# Patient Record
Sex: MALE | Race: WHITE | NOT HISPANIC OR LATINO
[De-identification: names, ages, dates, MRNs, and addresses within clinical notes are randomized per-mention and may not be internally consistent; named-entity substitution may affect disease eponyms.]

---

## 2018-08-06 PROBLEM — Z00.00 ENCOUNTER FOR PREVENTIVE HEALTH EXAMINATION: Status: ACTIVE | Noted: 2018-08-06

## 2018-08-14 ENCOUNTER — APPOINTMENT (OUTPATIENT)
Dept: ORTHOPEDIC SURGERY | Facility: CLINIC | Age: 53
End: 2018-08-14

## 2021-03-24 ENCOUNTER — APPOINTMENT (OUTPATIENT)
Dept: PULMONOLOGY | Facility: CLINIC | Age: 56
End: 2021-03-24

## 2021-03-31 ENCOUNTER — APPOINTMENT (OUTPATIENT)
Dept: PULMONOLOGY | Facility: CLINIC | Age: 56
End: 2021-03-31
Payer: MEDICARE

## 2021-03-31 VITALS
SYSTOLIC BLOOD PRESSURE: 120 MMHG | WEIGHT: 203 LBS | HEIGHT: 70 IN | BODY MASS INDEX: 29.06 KG/M2 | DIASTOLIC BLOOD PRESSURE: 70 MMHG | HEART RATE: 70 BPM

## 2021-03-31 DIAGNOSIS — R06.83 SNORING: ICD-10-CM

## 2021-03-31 DIAGNOSIS — G47.19 OTHER HYPERSOMNIA: ICD-10-CM

## 2021-03-31 PROCEDURE — 99204 OFFICE O/P NEW MOD 45 MIN: CPT

## 2021-03-31 RX ORDER — SULFAMETHOXAZOLE AND TRIMETHOPRIM 800; 160 MG/1; MG/1
800-160 TABLET ORAL
Qty: 90 | Refills: 0 | Status: ACTIVE | COMMUNITY
Start: 2020-05-05

## 2021-03-31 RX ORDER — MONTELUKAST 10 MG/1
10 TABLET, FILM COATED ORAL
Qty: 90 | Refills: 0 | Status: ACTIVE | COMMUNITY
Start: 2020-12-16

## 2021-03-31 RX ORDER — LEVOTHYROXINE SODIUM 0.2 MG/1
200 TABLET ORAL
Qty: 104 | Refills: 0 | Status: ACTIVE | COMMUNITY
Start: 2021-03-10

## 2021-03-31 RX ORDER — BUDESONIDE 180 UG/1
180 AEROSOL, POWDER RESPIRATORY (INHALATION)
Qty: 2 | Refills: 0 | Status: ACTIVE | COMMUNITY
Start: 2021-03-19

## 2021-03-31 RX ORDER — OXYBUTYNIN CHLORIDE 5 MG/1
5 TABLET ORAL
Qty: 90 | Refills: 0 | Status: ACTIVE | COMMUNITY
Start: 2020-12-14

## 2021-03-31 RX ORDER — GABAPENTIN 600 MG/1
600 TABLET, COATED ORAL
Qty: 180 | Refills: 0 | Status: ACTIVE | COMMUNITY
Start: 2020-12-14
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
## 2021-03-31 NOTE — ASSESSMENT
[FreeTextEntry1] : Patient has symptoms suggestive of sleep apnea. Will order a sleep study. Discussed about details of the study and possible treatment options.\par

## 2021-03-31 NOTE — PHYSICAL EXAM
[General Appearance - Well Developed] : well developed [General Appearance - Well Nourished] : well nourished [Murmurs] : no murmurs [Heart Sounds] : normal S1 and S2 [] : no respiratory distress [Auscultation Breath Sounds / Voice Sounds] : lungs were clear to auscultation bilaterally [Oriented To Time, Place, And Person] : oriented to person, place, and time [Affect] : the affect was normal [FreeTextEntry1] : quadriplegic, has upper extremity function

## 2021-05-12 ENCOUNTER — APPOINTMENT (OUTPATIENT)
Dept: PULMONOLOGY | Facility: CLINIC | Age: 56
End: 2021-05-12
Payer: MEDICARE

## 2021-05-12 VITALS
BODY MASS INDEX: 29.06 KG/M2 | OXYGEN SATURATION: 93 % | HEART RATE: 66 BPM | HEIGHT: 70 IN | TEMPERATURE: 97.7 F | WEIGHT: 203 LBS | DIASTOLIC BLOOD PRESSURE: 72 MMHG | SYSTOLIC BLOOD PRESSURE: 110 MMHG | RESPIRATION RATE: 16 BRPM

## 2021-05-12 PROCEDURE — 99213 OFFICE O/P EST LOW 20 MIN: CPT

## 2021-05-12 NOTE — ASSESSMENT
[FreeTextEntry1] : 55-year-old man with severe obstructive sleep apnea with AHI of 34, today we showed different masks and I think the easiest one would be a nasal pillow Yadav mask that he will try with.\par \par Patient use it will use the CPAP with auto setting 5 to 20 cm and follow-up with me in 6 weeks time after he starts using the machine to adjust the pressures.

## 2021-05-12 NOTE — PHYSICAL EXAM
[General Appearance - Well Developed] : well developed [General Appearance - Well Nourished] : well nourished [Heart Sounds] : normal S1 and S2 [Murmurs] : no murmurs [] : no respiratory distress [Auscultation Breath Sounds / Voice Sounds] : lungs were clear to auscultation bilaterally [No Focal Deficits] : no focal deficits [Oriented To Time, Place, And Person] : oriented to person, place, and time [FreeTextEntry1] : no edema
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
## 2021-07-20 ENCOUNTER — APPOINTMENT (OUTPATIENT)
Dept: SURGERY | Facility: CLINIC | Age: 56
End: 2021-07-20

## 2021-08-02 ENCOUNTER — APPOINTMENT (OUTPATIENT)
Dept: VASCULAR SURGERY | Facility: CLINIC | Age: 56
End: 2021-08-02

## 2021-11-02 ENCOUNTER — APPOINTMENT (OUTPATIENT)
Dept: SURGERY | Facility: CLINIC | Age: 56
End: 2021-11-02

## 2021-11-09 ENCOUNTER — APPOINTMENT (OUTPATIENT)
Dept: SURGERY | Facility: CLINIC | Age: 56
End: 2021-11-09

## 2021-11-16 ENCOUNTER — APPOINTMENT (OUTPATIENT)
Dept: SURGERY | Facility: CLINIC | Age: 56
End: 2021-11-16

## 2021-11-30 ENCOUNTER — APPOINTMENT (OUTPATIENT)
Dept: SURGERY | Facility: CLINIC | Age: 56
End: 2021-11-30

## 2021-12-07 ENCOUNTER — APPOINTMENT (OUTPATIENT)
Dept: SURGERY | Facility: CLINIC | Age: 56
End: 2021-12-07

## 2021-12-28 ENCOUNTER — APPOINTMENT (OUTPATIENT)
Dept: SURGERY | Facility: CLINIC | Age: 56
End: 2021-12-28

## 2022-01-04 ENCOUNTER — APPOINTMENT (OUTPATIENT)
Dept: SURGERY | Facility: CLINIC | Age: 57
End: 2022-01-04

## 2022-01-11 ENCOUNTER — APPOINTMENT (OUTPATIENT)
Dept: SURGERY | Facility: CLINIC | Age: 57
End: 2022-01-11

## 2022-01-25 ENCOUNTER — APPOINTMENT (OUTPATIENT)
Dept: SURGERY | Facility: CLINIC | Age: 57
End: 2022-01-25

## 2022-02-01 ENCOUNTER — APPOINTMENT (OUTPATIENT)
Dept: SURGERY | Facility: CLINIC | Age: 57
End: 2022-02-01

## 2022-02-03 ENCOUNTER — APPOINTMENT (OUTPATIENT)
Dept: THORACIC SURGERY | Facility: CLINIC | Age: 57
End: 2022-02-03
Payer: MEDICARE

## 2022-02-03 VITALS
DIASTOLIC BLOOD PRESSURE: 88 MMHG | HEART RATE: 65 BPM | WEIGHT: 205 LBS | BODY MASS INDEX: 29.41 KG/M2 | RESPIRATION RATE: 16 BRPM | TEMPERATURE: 97.8 F | OXYGEN SATURATION: 95 % | SYSTOLIC BLOOD PRESSURE: 128 MMHG

## 2022-02-03 PROCEDURE — 99204 OFFICE O/P NEW MOD 45 MIN: CPT

## 2022-02-08 NOTE — PHYSICAL EXAM
[General Appearance - Alert] : alert [General Appearance - In No Acute Distress] : in no acute distress [Sclera] : the sclera and conjunctiva were normal [PERRL With Normal Accommodation] : pupils were equal in size, round, and reactive to light [Extraocular Movements] : extraocular movements were intact [Outer Ear] : the ears and nose were normal in appearance [Oropharynx] : the oropharynx was normal [Neck Appearance] : the appearance of the neck was normal [Neck Cervical Mass (___cm)] : no neck mass was observed [Jugular Venous Distention Increased] : there was no jugular-venous distention [Thyroid Diffuse Enlargement] : the thyroid was not enlarged [Thyroid Nodule] : there were no palpable thyroid nodules [Auscultation Breath Sounds / Voice Sounds] : lungs were clear to auscultation bilaterally [Heart Rate And Rhythm] : heart rate was normal and rhythm regular [Heart Sounds] : normal S1 and S2 [Heart Sounds Gallop] : no gallops [Murmurs] : no murmurs [Heart Sounds Pericardial Friction Rub] : no pericardial rub [FreeTextEntry1] : Right lateral flank with nonhealing wound with greenish drainage no tenderness approximately 4 cm in size [Bowel Sounds] : normal bowel sounds [Abdomen Soft] : soft [Abdomen Tenderness] : non-tender [] : no hepato-splenomegaly [Abdomen Mass (___ Cm)] : no abdominal mass palpated [Cervical Lymph Nodes Enlarged Posterior Bilaterally] : posterior cervical [Cervical Lymph Nodes Enlarged Anterior Bilaterally] : anterior cervical [Supraclavicular Lymph Nodes Enlarged Bilaterally] : supraclavicular [Axillary Lymph Nodes Enlarged Bilaterally] : axillary [Femoral Lymph Nodes Enlarged Bilaterally] : femoral [Inguinal Lymph Nodes Enlarged Bilaterally] : inguinal [No CVA Tenderness] : no ~M costovertebral angle tenderness [No Spinal Tenderness] : no spinal tenderness

## 2022-02-08 NOTE — REASON FOR VISIT
[Initial Evaluation] : an initial evaluation [FreeTextEntry1] : Right flank abscess/hematoma going down to the rib

## 2022-02-08 NOTE — DATA REVIEWED
[FreeTextEntry1] : CT Chest 1/19/22: \par \par History: T79.2XXD.\par \par CT chest and abdomen 1/19/2022 \par \par Thin helical axial sections through the chest and abdomen obtained without\par oral intravenous contrast. Coronal and sagittal reconstructed images provided.\par \par Comparison made to previous CT chest 9/24/2021 and abdominal CT 8/16/2020\par \par CT CHEST:\par \par Lung images again demonstrates bilateral chronic left greater the right lower\par lobe and to lesser extent right middle lobe parenchymal disease probably\par representing combination of atelectasis or fibrosis. There are no new areas of\par airspace disease. There is no central endobronchial obstructing lesions. There\par is no pneumothorax.\par \par Soft tissue images again demonstrates a residual right lateral chest wall\par abnormal area of soft tissue density within subcutaneous tissues presumably\par representing chronic hematoma measuring approximately 3.1 x 8.5 x 7.4 cm\par without overall significant change since prior study. Lack of IV contrast\par limits evaluation indeterminant possible cystic internal component of this\par soft tissue area. There is interval development of a cutaneous skin defect\par measuring approximately 4 cm at the inferior margin of the complex soft tissue\par inflammatory process. Clinical correlation. There is no adjacent rib fracture\par or cortical bone destruction. There is mild increased sclerosis within the\par right anterolateral sixth rib at the level of the soft tissue inflammatory\par process similar to prior study which may relate to possible reactive bony\par changes.\par \par There is no mediastinal mass or lymphadenopathy. There is residual thymic\par tissue within the anterior mediastinum. There is nonspecific interval increase\par in size of a couple of adjacent right axillary lymph nodes with the largest\par measuring 2.6 hemorrhage is on coronal image 31, previously 1.9 cm. The\par finding may relate to reactive increased size of lymph nodes given the right\par chest wall inflammatory process or other etiologies. Recommend clinical\par follow-up to resolution. The thoracic aorta is normal in caliber. The heart is\par normal in size. There is no pericardial effusion. There are bilateral chronic\par trace pleural effusions. There is a small hiatal hernia.\par \par Bone images demonstrate no acute fracture and similar to prior study.\par \par CT ABDOMEN:\par \par The liver is normal in size without focal mass on this noncontrast study. The\par spleen is small in size containing scattered calcifications likely related to\par old granulomatous disease. The gallbladder is contracted.\par \par The pancreas is normal in size. There are no adrenal masses. The kidneys\par reveal no calculi or hydronephrosis. The abdominal aorta is normal in caliber.\par There is no retroperitoneal lymphadenopathy.\par \par There is no bowel obstructing process. The stomach is distended with ingested\par liquid and food material. There is no ascites. There is no mesenteric\par lymphadenopathy.\par \par Bone images imaging no acute fracture. There is again a sclerotic density\par within the L1 vertebral body.\par \par IMPRESSION:\par \par Persistent right lateral abdominal wall subcutaneous soft tissue process\par presumably representing chronic hematoma with interval development of a 4 cm\par cutaneous defect. Clinical correlation.\par \par Persistent bilateral left greater than right lower lobe probably probably\par representing atelectasis/fibrosis.\par \par Persistent trace pleural effusion.\par \par Mild increase size of right axillary lymph node suggestive of adenopathy, may\par be reactive secondary to right chest wall inflammatory process. Recommend\par clinical follow-up to resolution.\par \par No acute abdominal inflammatory process.\par \par
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
## 2022-02-08 NOTE — CONSULT LETTER
[Dear  ___] : Dear  [unfilled], [Consult Letter:] : I had the pleasure of evaluating your patient, [unfilled]. [Please see my note below.] : Please see my note below. [Consult Closing:] : Thank you very much for allowing me to participate in the care of this patient.  If you have any questions, please do not hesitate to contact me. [Sincerely,] : Sincerely, [FreeTextEntry3] : Maximo Whitt MD\par Cardiothoracic Surgery\par [facility]\par

## 2022-02-08 NOTE — ASSESSMENT
[FreeTextEntry1] : This patient is a 56-year-old male who has a history of paraplegia.  The patient has a nonhealing wound in the right flank that is deep to the level of the rib.  The patient was evaluated and treated at wound care without any complete healing.  I believe the patient will need an incision and drainage and possible rib resection with washout.  I would recommend surgical drainage.  The patient will be scheduled for the operating room.  I would plan on removing part of the rib and sending it to rule out osteomyelitis as well as leaving the wound open for delayed healing with a wound VAC.  This was all discussed with the patient in detail.  All questions were answered.  He will be scheduled for surgery in the next week.

## 2022-02-09 ENCOUNTER — APPOINTMENT (OUTPATIENT)
Dept: THORACIC SURGERY | Facility: HOSPITAL | Age: 57
End: 2022-02-09

## 2022-02-15 ENCOUNTER — APPOINTMENT (OUTPATIENT)
Dept: SURGERY | Facility: CLINIC | Age: 57
End: 2022-02-15

## 2022-02-24 ENCOUNTER — APPOINTMENT (OUTPATIENT)
Dept: THORACIC SURGERY | Facility: CLINIC | Age: 57
End: 2022-02-24

## 2022-02-24 DIAGNOSIS — S21.101D: ICD-10-CM

## 2022-03-01 ENCOUNTER — APPOINTMENT (OUTPATIENT)
Dept: SURGERY | Facility: CLINIC | Age: 57
End: 2022-03-01

## 2022-03-08 ENCOUNTER — APPOINTMENT (OUTPATIENT)
Dept: SURGERY | Facility: CLINIC | Age: 57
End: 2022-03-08

## 2022-03-15 ENCOUNTER — APPOINTMENT (OUTPATIENT)
Dept: SURGERY | Facility: CLINIC | Age: 57
End: 2022-03-15

## 2022-03-22 ENCOUNTER — APPOINTMENT (OUTPATIENT)
Dept: SURGERY | Facility: CLINIC | Age: 57
End: 2022-03-22

## 2022-03-29 ENCOUNTER — APPOINTMENT (OUTPATIENT)
Dept: SURGERY | Facility: CLINIC | Age: 57
End: 2022-03-29

## 2022-04-01 ENCOUNTER — APPOINTMENT (OUTPATIENT)
Dept: SURGERY | Facility: CLINIC | Age: 57
End: 2022-04-01

## 2022-04-05 ENCOUNTER — APPOINTMENT (OUTPATIENT)
Dept: SURGERY | Facility: CLINIC | Age: 57
End: 2022-04-05

## 2022-04-06 PROBLEM — R33.9 URINARY RETENTION: Status: ACTIVE | Noted: 2022-04-06

## 2022-04-06 PROBLEM — Z85.850 HISTORY OF PAPILLARY ADENOCARCINOMA OF THYROID: Status: RESOLVED | Noted: 2022-04-06 | Resolved: 2022-04-06

## 2022-04-07 ENCOUNTER — APPOINTMENT (OUTPATIENT)
Dept: THORACIC SURGERY | Facility: CLINIC | Age: 57
End: 2022-04-07
Payer: MEDICARE

## 2022-04-07 VITALS
TEMPERATURE: 98 F | HEIGHT: 70 IN | WEIGHT: 205 LBS | OXYGEN SATURATION: 97 % | HEART RATE: 77 BPM | SYSTOLIC BLOOD PRESSURE: 130 MMHG | BODY MASS INDEX: 29.35 KG/M2 | DIASTOLIC BLOOD PRESSURE: 80 MMHG | RESPIRATION RATE: 14 BRPM

## 2022-04-07 DIAGNOSIS — R33.9 RETENTION OF URINE, UNSPECIFIED: ICD-10-CM

## 2022-04-07 DIAGNOSIS — Z85.850 PERSONAL HISTORY OF MALIGNANT NEOPLASM OF THYROID: ICD-10-CM

## 2022-04-07 PROBLEM — S21.101D: Status: ACTIVE | Noted: 2022-04-07

## 2022-04-07 PROCEDURE — 99024 POSTOP FOLLOW-UP VISIT: CPT

## 2022-04-11 NOTE — PROCEDURE
[FreeTextEntry1] : Wound care: dressing removed and replaced with 5.5cm Allevyn. Wound packing left in place as patient is scheduled to see VNS later today.

## 2022-04-11 NOTE — DATA REVIEWED
[FreeTextEntry1] : CT chest 3/10 2022\par \par Findings:\par LUNGS/AIRWAYS: There is subsegmental bilateral lower lobe atelectasis. There are no alveolar infiltrates. There are no suspicious pulmonary nodules.\par \par MEDIASTINUM/ERIN: No lymphadenopathy or mass lesions.\par \par CARDIOVASCULAR: Cardiac size is normal. There is a central venous catheter in place which extends through the innominate vein to the level of the distal superior vena cava.\par \par CHEST WALL/PLEURA: Within the right lateral chest wall, there is a 5.4 x 4.8 x 5.7 cm region of increased soft tissue density within the deep soft tissues extending into the intercostal space. The location of abnormality is associated with location of right lateral 7th rib resection. A partial air filled tract extends to the skin. No tract extends into the intercostal space. There is associated pleural thickening. There is a small layering right pleural effusion. \par \par AXILLA/SUPRACLAVICULAR: Several mildly enlarged right axillary lymph nodes are present and measure up to 18x12 mm in size. The visualized lymph nodes demonstrate preservation of the fatty erin without suspicious morphological characteristics.\par \par BONES: There is evidence of segmental absence of the lateral right seventh rib. Suspicious for prior rib resection, correlation with surgical history is advised.\par \par IMPRESSION:\par \par Small right pleural effusion and subsegmental bibasilar lower lobe atelectasis. Right chest wall mass-like lesion with associated cutaneous tract, associated with segmental absence of the right 7th rib. The imaging findings are nonspecific. No discrete loculated collections are identified.

## 2022-04-11 NOTE — ASSESSMENT
[FreeTextEntry1] : 56 year old male now POD 57 s/p incision and drainage of right chest wall wound. Patient states he is healing well and depth of wound has been gradually decreasing when measured by wound care.Patient has been followed post-op by Dr. Mica Underwood for wound care and Dr. Jan Michelle for ID on IV zosyn for pseudomonas and enterococcus found in the wound. Patient has no new complaints. Recent CT chest was reviewed and demonstrated subcutaneous tract with no loculations. At this time patient was advised to continue to follow with wound care and infectious disease with no further thoracic surgery procedure indicated. All questions were answered and patient will reach out to our office with any future concerns.

## 2022-04-11 NOTE — HISTORY OF PRESENT ILLNESS
[FreeTextEntry1] : This is a 56-year-old male s/p incision and drainage of right flank wound and excision of a portion of the eight rib on 2/9/22 for suspected osteomyelitis.  Cultures from that revealed Pseudomonas aeruginosa and Enterococcus faecalis. The patient is on IV Zosyn. He has a VAC dressing and a catheter from the Zosyn. Per ID note on 3/15,\par Wound is was continuing to look improved. The patient did have CT scan of the chest and abdomen after a previous visit with ID (Dr. Michelle) where he had complaints of worsening symptoms when he laid in bed in a certain way. The CT scan showed small right pleural effusion and subsegmental bibasilar lower lobe atelectasis. Right chest wall mass-like lesion with associated cutaneous tract, associated with segmental absence of the right 7th rib. The imaging findings are nonspecific. No discrete loculated collections are identified. Patient is here to follow up on surgical wound and recent imaging.

## 2022-04-11 NOTE — PHYSICAL EXAM
[Sclera] : the sclera and conjunctiva were normal [PERRL With Normal Accommodation] : pupils were equal in size, round, and reactive to light [Extraocular Movements] : extraocular movements were intact [Neck Appearance] : the appearance of the neck was normal [Neck Cervical Mass (___cm)] : no neck mass was observed [Jugular Venous Distention Increased] : there was no jugular-venous distention [Thyroid Diffuse Enlargement] : the thyroid was not enlarged [Thyroid Nodule] : there were no palpable thyroid nodules [Respiration, Rhythm And Depth] : normal respiratory rhythm and effort [Exaggerated Use Of Accessory Muscles For Inspiration] : no accessory muscle use [Auscultation Breath Sounds / Voice Sounds] : lungs were clear to auscultation bilaterally [Heart Rate And Rhythm] : heart rate was normal and rhythm regular [Heart Sounds] : normal S1 and S2 [Heart Sounds Gallop] : no gallops [Murmurs] : no murmurs [Heart Sounds Pericardial Friction Rub] : no pericardial rub [Examination Of The Chest] : the chest was normal in appearance [Diminished Respiratory Excursion] : normal chest expansion [Bowel Sounds] : normal bowel sounds [Abdomen Soft] : soft [Abdomen Tenderness] : non-tender [Abdomen Mass (___ Cm)] : no abdominal mass palpated [No CVA Tenderness] : no ~M costovertebral angle tenderness [No Spinal Tenderness] : no spinal tenderness [Skin Color & Pigmentation] : normal skin color and pigmentation [Skin Turgor] : normal skin turgor [] : no rash [Oriented To Time, Place, And Person] : oriented to person, place, and time [Impaired Insight] : insight and judgment were intact [Affect] : the affect was normal [Mood] : the mood was normal [FreeTextEntry1] : quadraplegia without sensation below upper chest

## 2022-06-07 ENCOUNTER — APPOINTMENT (OUTPATIENT)
Dept: SURGERY | Facility: CLINIC | Age: 57
End: 2022-06-07

## 2022-06-17 ENCOUNTER — APPOINTMENT (OUTPATIENT)
Dept: SURGERY | Facility: CLINIC | Age: 57
End: 2022-06-17

## 2022-09-27 ENCOUNTER — APPOINTMENT (OUTPATIENT)
Dept: SURGERY | Facility: CLINIC | Age: 57
End: 2022-09-27

## 2022-10-04 ENCOUNTER — RESULT REVIEW (OUTPATIENT)
Age: 57
End: 2022-10-04

## 2022-10-04 ENCOUNTER — APPOINTMENT (OUTPATIENT)
Dept: SURGERY | Facility: CLINIC | Age: 57
End: 2022-10-04

## 2022-12-27 ENCOUNTER — APPOINTMENT (OUTPATIENT)
Dept: HEART AND VASCULAR | Facility: CLINIC | Age: 57
End: 2022-12-27

## 2022-12-27 ENCOUNTER — NON-APPOINTMENT (OUTPATIENT)
Age: 57
End: 2022-12-27

## 2022-12-27 VITALS — SYSTOLIC BLOOD PRESSURE: 110 MMHG | HEART RATE: 76 BPM | OXYGEN SATURATION: 96 % | DIASTOLIC BLOOD PRESSURE: 90 MMHG

## 2022-12-27 DIAGNOSIS — I87.399 CHRONIC VENOUS HYPERTENSION (IDIOPATHIC) WITH OTHER COMPLICATIONS OF UNSPECIFIED LOWER EXTREMITY: ICD-10-CM

## 2022-12-27 DIAGNOSIS — I87.8 OTHER SPECIFIED DISORDERS OF VEINS: ICD-10-CM

## 2022-12-27 DIAGNOSIS — M79.89 OTHER SPECIFIED SOFT TISSUE DISORDERS: ICD-10-CM

## 2022-12-27 DIAGNOSIS — G82.50 QUADRIPLEGIA, UNSPECIFIED: ICD-10-CM

## 2022-12-27 DIAGNOSIS — I10 ESSENTIAL (PRIMARY) HYPERTENSION: ICD-10-CM

## 2022-12-27 DIAGNOSIS — G47.33 OBSTRUCTIVE SLEEP APNEA (ADULT) (PEDIATRIC): ICD-10-CM

## 2022-12-27 PROCEDURE — 99204 OFFICE O/P NEW MOD 45 MIN: CPT

## 2022-12-27 PROCEDURE — 93000 ELECTROCARDIOGRAM COMPLETE: CPT

## 2022-12-27 RX ORDER — AMMONIUM LACTATE 12 %
12 CREAM (GRAM) TOPICAL TWICE DAILY
Qty: 1 | Refills: 3 | Status: ACTIVE | COMMUNITY
Start: 2022-12-27 | End: 1900-01-01

## 2022-12-27 NOTE — PHYSICAL EXAM
[No Carotid Bruit] : no carotid bruit [Clear Lung Fields] : clear lung fields [Good Air Entry] : good air entry [Soft] : abdomen soft [Non Tender] : non-tender [de-identified] : wheel chair bound, paralyzed waist down.  upper extremity weakness  [de-identified] : bilateral pitting edema chronic venous stasis changes, right heel wound covered in bandaging.
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
## 2022-12-27 NOTE — CARDIOLOGY SUMMARY
[de-identified] : ECHO Nov 2022\par 1. Left ventricular systolic function is normal with an ejection fraction of 65-70 % by visual estimate.\par 2. Left ventricular segmental wall motion is normal.\par 3. The left ventricle demonstrates indeterminate diastolic function, cannot determine left atrial pressure.\par 4. There is mild left ventricular hypertrophy.\par 5. Right ventricular systolic function is normal.\par 6. Unable to estimate pulmonary artery pressure due to an insufficient tricuspid regurgitation Doppler waveform.\par 7. Left atrial chamber dimension is not well visualized. Left atrium is obscured by the descending thoracic aorta

## 2022-12-27 NOTE — REVIEW OF SYSTEMS
[Lower Ext Edema] : lower extremity edema [Skin Lesions] : skin lesion(s): [Negative] : Heme/Lymph [FreeTextEntry9] : chronic spinal stenosis back pain

## 2023-02-07 ENCOUNTER — RESULT REVIEW (OUTPATIENT)
Age: 58
End: 2023-02-07

## 2023-02-28 ENCOUNTER — APPOINTMENT (OUTPATIENT)
Dept: SURGERY | Facility: CLINIC | Age: 58
End: 2023-02-28

## 2023-03-28 ENCOUNTER — APPOINTMENT (OUTPATIENT)
Dept: HEART AND VASCULAR | Facility: CLINIC | Age: 58
End: 2023-03-28

## 2023-05-09 ENCOUNTER — APPOINTMENT (OUTPATIENT)
Dept: SURGERY | Facility: CLINIC | Age: 58
End: 2023-05-09

## 2023-06-05 ENCOUNTER — APPOINTMENT (OUTPATIENT)
Dept: THORACIC SURGERY | Facility: HOSPITAL | Age: 58
End: 2023-06-05

## 2023-06-25 NOTE — HISTORY OF PRESENT ILLNESS
[FreeTextEntry1] : 57-year-old male s/p incision and drainage of right flank wound and excision of a portion of the eight rib on 2/9/22 with Dr. Whitt for suspected osteomyelitis (path negative). Cultures from that revealed Pseudomonas aeruginosa and Enterococcus faecalis. The patient was on IV Zosyn. The patient did have CT scan of the chest and abdomen after a previous visit with ID (Dr. Michelle) where he had complaints of worsening symptoms when he laid in bed in a certain way. The CT scan showed small right pleural effusion and subsegmental bibasilar lower lobe atelectasis. Right chest wall mass-like lesion with associated cutaneous tract, associated with segmental absence of the right 7th rib. The imaging findings were nonspecific. Patient had been followed post-op by Dr. Mica Underwood for wound care and Dr. Jan Michelle for ID At this time patient was advised to continue to follow with wound care and infectious disease. Patient now feels hematoma has returned and increased in size. He now presents to thoracic surgery for evaluation and review of CT performed 6/23. \par \par

## 2023-06-25 NOTE — ASSESSMENT
[FreeTextEntry1] : Patient is a 57 year old male who underwent partial 7th rib resection and debridement with Dr. Whitt on 2/9/22. He was managed postoperatively by ID and wound management. He returns today to review his CT scan.

## 2023-06-26 ENCOUNTER — APPOINTMENT (OUTPATIENT)
Dept: THORACIC SURGERY | Facility: CLINIC | Age: 58
End: 2023-06-26

## 2023-08-15 ENCOUNTER — APPOINTMENT (OUTPATIENT)
Dept: SURGERY | Facility: CLINIC | Age: 58
End: 2023-08-15

## 2023-08-16 ENCOUNTER — NON-APPOINTMENT (OUTPATIENT)
Age: 58
End: 2023-08-16

## 2023-08-21 ENCOUNTER — APPOINTMENT (OUTPATIENT)
Dept: THORACIC SURGERY | Facility: CLINIC | Age: 58
End: 2023-08-21

## 2023-09-05 ENCOUNTER — APPOINTMENT (OUTPATIENT)
Dept: SURGERY | Facility: CLINIC | Age: 58
End: 2023-09-05

## 2023-09-11 ENCOUNTER — RESULT REVIEW (OUTPATIENT)
Age: 58
End: 2023-09-11

## 2023-09-19 ENCOUNTER — APPOINTMENT (OUTPATIENT)
Dept: SURGERY | Facility: CLINIC | Age: 58
End: 2023-09-19

## 2023-09-27 ENCOUNTER — APPOINTMENT (OUTPATIENT)
Dept: THORACIC SURGERY | Facility: CLINIC | Age: 58
End: 2023-09-27
Payer: MEDICARE

## 2023-09-27 PROCEDURE — 99442: CPT | Mod: 95

## 2023-10-16 ENCOUNTER — APPOINTMENT (OUTPATIENT)
Dept: THORACIC SURGERY | Facility: CLINIC | Age: 58
End: 2023-10-16
Payer: MEDICARE

## 2023-10-16 VITALS
OXYGEN SATURATION: 94 % | RESPIRATION RATE: 14 BRPM | HEIGHT: 71 IN | HEART RATE: 74 BPM | BODY MASS INDEX: 28.7 KG/M2 | DIASTOLIC BLOOD PRESSURE: 62 MMHG | SYSTOLIC BLOOD PRESSURE: 92 MMHG | WEIGHT: 205 LBS

## 2023-10-16 PROCEDURE — 99214 OFFICE O/P EST MOD 30 MIN: CPT

## 2024-03-01 ENCOUNTER — NON-APPOINTMENT (OUTPATIENT)
Age: 59
End: 2024-03-01

## 2024-03-19 ENCOUNTER — APPOINTMENT (OUTPATIENT)
Dept: PAIN MANAGEMENT | Facility: CLINIC | Age: 59
End: 2024-03-19

## 2024-04-02 ENCOUNTER — APPOINTMENT (OUTPATIENT)
Dept: SURGERY | Facility: CLINIC | Age: 59
End: 2024-04-02

## 2024-04-05 ENCOUNTER — APPOINTMENT (OUTPATIENT)
Dept: THORACIC SURGERY | Facility: CLINIC | Age: 59
End: 2024-04-05
Payer: MEDICARE

## 2024-04-05 PROCEDURE — 99213 OFFICE O/P EST LOW 20 MIN: CPT

## 2024-06-05 ENCOUNTER — RESULT REVIEW (OUTPATIENT)
Age: 59
End: 2024-06-05

## 2024-06-05 ENCOUNTER — APPOINTMENT (OUTPATIENT)
Dept: THORACIC SURGERY | Facility: HOSPITAL | Age: 59
End: 2024-06-05

## 2024-06-07 ENCOUNTER — TRANSCRIPTION ENCOUNTER (OUTPATIENT)
Age: 59
End: 2024-06-07

## 2024-06-11 ENCOUNTER — TRANSCRIPTION ENCOUNTER (OUTPATIENT)
Age: 59
End: 2024-06-11

## 2024-06-21 ENCOUNTER — APPOINTMENT (OUTPATIENT)
Dept: THORACIC SURGERY | Facility: CLINIC | Age: 59
End: 2024-06-21

## 2024-10-11 ENCOUNTER — APPOINTMENT (OUTPATIENT)
Dept: THORACIC SURGERY | Facility: CLINIC | Age: 59
End: 2024-10-11

## 2025-01-14 ENCOUNTER — APPOINTMENT (OUTPATIENT)
Dept: HEART AND VASCULAR | Facility: CLINIC | Age: 60
End: 2025-01-14
Payer: MEDICARE

## 2025-01-14 ENCOUNTER — NON-APPOINTMENT (OUTPATIENT)
Age: 60
End: 2025-01-14

## 2025-01-14 VITALS
OXYGEN SATURATION: 94 % | BODY MASS INDEX: 25.9 KG/M2 | HEART RATE: 82 BPM | DIASTOLIC BLOOD PRESSURE: 60 MMHG | SYSTOLIC BLOOD PRESSURE: 80 MMHG | WEIGHT: 185 LBS | HEIGHT: 71 IN

## 2025-01-14 DIAGNOSIS — G47.33 OBSTRUCTIVE SLEEP APNEA (ADULT) (PEDIATRIC): ICD-10-CM

## 2025-01-14 DIAGNOSIS — I10 ESSENTIAL (PRIMARY) HYPERTENSION: ICD-10-CM

## 2025-01-14 DIAGNOSIS — I87.8 OTHER SPECIFIED DISORDERS OF VEINS: ICD-10-CM

## 2025-01-14 DIAGNOSIS — M79.89 OTHER SPECIFIED SOFT TISSUE DISORDERS: ICD-10-CM

## 2025-01-14 DIAGNOSIS — I87.399 CHRONIC VENOUS HYPERTENSION (IDIOPATHIC) WITH OTHER COMPLICATIONS OF UNSPECIFIED LOWER EXTREMITY: ICD-10-CM

## 2025-01-14 DIAGNOSIS — Z87.898 PERSONAL HISTORY OF OTHER SPECIFIED CONDITIONS: ICD-10-CM

## 2025-01-14 DIAGNOSIS — G82.50 QUADRIPLEGIA, UNSPECIFIED: ICD-10-CM

## 2025-01-14 PROCEDURE — 99214 OFFICE O/P EST MOD 30 MIN: CPT

## 2025-01-14 PROCEDURE — 93000 ELECTROCARDIOGRAM COMPLETE: CPT

## 2025-01-14 PROCEDURE — G2211 COMPLEX E/M VISIT ADD ON: CPT

## 2025-01-14 RX ORDER — AMMONIUM LACTATE 12 %
12 CREAM (GRAM) TOPICAL TWICE DAILY
Qty: 1 | Refills: 3 | Status: ACTIVE | COMMUNITY
Start: 2025-01-14 | End: 1900-01-01

## 2025-01-15 LAB
ALBUMIN SERPL ELPH-MCNC: 4 G/DL
ALP BLD-CCNC: 87 U/L
ALT SERPL-CCNC: 15 U/L
ANION GAP SERPL CALC-SCNC: 15 MMOL/L
AST SERPL-CCNC: 18 U/L
BILIRUB SERPL-MCNC: 0.4 MG/DL
BUN SERPL-MCNC: 12 MG/DL
CALCIUM SERPL-MCNC: 9 MG/DL
CHLORIDE SERPL-SCNC: 99 MMOL/L
CHOLEST SERPL-MCNC: 160 MG/DL
CO2 SERPL-SCNC: 23 MMOL/L
CREAT SERPL-MCNC: 0.36 MG/DL
EGFR: 130 ML/MIN/1.73M2
ESTIMATED AVERAGE GLUCOSE: 103 MG/DL
GLUCOSE SERPL-MCNC: 132 MG/DL
HBA1C MFR BLD HPLC: 5.2 %
HCT VFR BLD CALC: 43.3 %
HDLC SERPL-MCNC: 45 MG/DL
HGB BLD-MCNC: 14.4 G/DL
LDLC SERPL CALC-MCNC: 100 MG/DL
MCHC RBC-ENTMCNC: 31.2 PG
MCHC RBC-ENTMCNC: 33.3 G/DL
MCV RBC AUTO: 93.9 FL
NONHDLC SERPL-MCNC: 115 MG/DL
NT-PROBNP SERPL-MCNC: 41 PG/ML
PLATELET # BLD AUTO: 343 K/UL
POTASSIUM SERPL-SCNC: 4 MMOL/L
PROT SERPL-MCNC: 6.6 G/DL
RBC # BLD: 4.61 M/UL
RBC # FLD: 13.9 %
SODIUM SERPL-SCNC: 137 MMOL/L
TRIGL SERPL-MCNC: 76 MG/DL
TSH SERPL-ACNC: 4.8 UIU/ML
WBC # FLD AUTO: 7.09 K/UL

## 2025-02-05 ENCOUNTER — RESULT REVIEW (OUTPATIENT)
Age: 60
End: 2025-02-05

## 2025-03-26 DIAGNOSIS — I71.21 ANEURYSM OF THE ASCENDING AORTA, WITHOUT RUPTURE: ICD-10-CM

## 2025-05-06 ENCOUNTER — RESULT REVIEW (OUTPATIENT)
Age: 60
End: 2025-05-06

## 2025-05-08 ENCOUNTER — RESULT REVIEW (OUTPATIENT)
Age: 60
End: 2025-05-08

## 2025-05-14 ENCOUNTER — TRANSCRIPTION ENCOUNTER (OUTPATIENT)
Age: 60
End: 2025-05-14

## 2025-06-04 ENCOUNTER — TRANSCRIPTION ENCOUNTER (OUTPATIENT)
Age: 60
End: 2025-06-04